# Patient Record
Sex: FEMALE | Race: WHITE | Employment: OTHER | URBAN - METROPOLITAN AREA
[De-identification: names, ages, dates, MRNs, and addresses within clinical notes are randomized per-mention and may not be internally consistent; named-entity substitution may affect disease eponyms.]

---
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
## 2020-01-23 ENCOUNTER — HOSPITAL ENCOUNTER (OUTPATIENT)
Dept: LAB | Age: 20
Discharge: HOME | End: 2020-01-23
Payer: COMMERCIAL

## 2020-01-23 DIAGNOSIS — E53.8 VITAMIN B12 DEFICIENCY: ICD-10-CM

## 2020-01-23 DIAGNOSIS — E61.1 IRON DEFICIENCY: ICD-10-CM

## 2020-01-23 LAB
ALBUMIN SERPL-MCNC: 3.9 G/DL (ref 3.5–5)
ALBUMIN/GLOB SERPL: 1 {RATIO} (ref 1.2–3.5)
ALP SERPL-CCNC: 66 U/L (ref 50–136)
ALT SERPL-CCNC: 27 U/L (ref 12–65)
ANION GAP SERPL CALC-SCNC: 5 MMOL/L (ref 7–16)
AST SERPL-CCNC: 12 U/L (ref 15–37)
BASOPHILS # BLD: 0 K/UL (ref 0–0.2)
BASOPHILS NFR BLD: 1 % (ref 0–2)
BILIRUB SERPL-MCNC: 0.2 MG/DL (ref 0.2–1.1)
BUN SERPL-MCNC: 11 MG/DL (ref 6–23)
CALCIUM SERPL-MCNC: 9.2 MG/DL (ref 8.3–10.4)
CHLORIDE SERPL-SCNC: 105 MMOL/L (ref 98–107)
CO2 SERPL-SCNC: 27 MMOL/L (ref 21–32)
CREAT SERPL-MCNC: 0.6 MG/DL (ref 0.6–1)
DIFFERENTIAL METHOD BLD: ABNORMAL
EOSINOPHIL # BLD: 0 K/UL (ref 0–0.8)
EOSINOPHIL NFR BLD: 1 % (ref 0.5–7.8)
ERYTHROCYTE [DISTWIDTH] IN BLOOD BY AUTOMATED COUNT: 11.9 % (ref 11.9–14.6)
FERRITIN SERPL-MCNC: 114 NG/ML (ref 8–388)
GLOBULIN SER CALC-MCNC: 4 G/DL (ref 2.3–3.5)
GLUCOSE SERPL-MCNC: 97 MG/DL (ref 65–100)
HCT VFR BLD AUTO: 40 % (ref 35.8–46.3)
HGB BLD-MCNC: 13.7 G/DL (ref 11.7–15.4)
HGB RETIC QN AUTO: 36 PG (ref 29–35)
IMM GRANULOCYTES # BLD AUTO: 0 K/UL (ref 0–0.5)
IMM GRANULOCYTES NFR BLD AUTO: 0 % (ref 0–5)
IMM RETICS NFR: 2.2 % (ref 3–15.9)
IRON SATN MFR SERPL: 15 %
IRON SERPL-MCNC: 53 UG/DL (ref 35–150)
LYMPHOCYTES # BLD: 1.5 K/UL (ref 0.5–4.6)
LYMPHOCYTES NFR BLD: 41 % (ref 13–44)
MCH RBC QN AUTO: 30.9 PG (ref 26.1–32.9)
MCHC RBC AUTO-ENTMCNC: 34.3 G/DL (ref 31.4–35)
MCV RBC AUTO: 90.3 FL (ref 79.6–97.8)
MONOCYTES # BLD: 0.3 K/UL (ref 0.1–1.3)
MONOCYTES NFR BLD: 7 % (ref 4–12)
NEUTS SEG # BLD: 1.8 K/UL (ref 1.7–8.2)
NEUTS SEG NFR BLD: 50 % (ref 43–78)
NRBC # BLD: 0 K/UL (ref 0–0.2)
PLATELET # BLD AUTO: 231 K/UL (ref 150–450)
PMV BLD AUTO: 10.6 FL (ref 9.4–12.3)
POTASSIUM SERPL-SCNC: 3.8 MMOL/L (ref 3.5–5.1)
PROT SERPL-MCNC: 7.9 G/DL (ref 6.3–8.2)
RBC # BLD AUTO: 4.43 M/UL (ref 4.05–5.25)
RETICS # AUTO: 0.06 M/UL (ref 0.03–0.1)
RETICS/RBC NFR AUTO: 1.4 % (ref 0.3–2)
SODIUM SERPL-SCNC: 137 MMOL/L (ref 136–145)
TIBC SERPL-MCNC: 349 UG/DL (ref 250–450)
VIT B12 SERPL-MCNC: 300 PG/ML (ref 193–986)
WBC # BLD AUTO: 3.6 K/UL (ref 4.3–11.1)

## 2020-01-23 PROCEDURE — 36415 COLL VENOUS BLD VENIPUNCTURE: CPT

## 2020-01-23 PROCEDURE — 82607 VITAMIN B-12: CPT

## 2020-01-23 PROCEDURE — 83540 ASSAY OF IRON: CPT

## 2020-01-23 PROCEDURE — 82728 ASSAY OF FERRITIN: CPT

## 2020-01-23 PROCEDURE — 80053 COMPREHEN METABOLIC PANEL: CPT

## 2020-01-23 PROCEDURE — 85025 COMPLETE CBC W/AUTO DIFF WBC: CPT

## 2020-01-23 PROCEDURE — 85046 RETICYTE/HGB CONCENTRATE: CPT

## 2020-01-30 ENCOUNTER — HOSPITAL ENCOUNTER (OUTPATIENT)
Dept: INFUSION THERAPY | Age: 20
Discharge: HOME | End: 2020-01-30
Payer: COMMERCIAL

## 2020-01-30 ENCOUNTER — HOSPITAL ENCOUNTER (OUTPATIENT)
Dept: LAB | Age: 20
Discharge: HOME | End: 2020-01-30
Payer: COMMERCIAL

## 2020-01-30 VITALS
OXYGEN SATURATION: 100 % | TEMPERATURE: 98.6 F | SYSTOLIC BLOOD PRESSURE: 136 MMHG | HEART RATE: 78 BPM | DIASTOLIC BLOOD PRESSURE: 79 MMHG | RESPIRATION RATE: 18 BRPM

## 2020-01-30 DIAGNOSIS — E53.8 VITAMIN B12 DEFICIENCY: Primary | ICD-10-CM

## 2020-01-30 DIAGNOSIS — E61.1 IRON DEFICIENCY: ICD-10-CM

## 2020-01-30 LAB — HCG SERPL QL: NEGATIVE

## 2020-01-30 PROCEDURE — 74011250636 HC RX REV CODE- 250/636: Performed by: INTERNAL MEDICINE

## 2020-01-30 PROCEDURE — 36415 COLL VENOUS BLD VENIPUNCTURE: CPT

## 2020-01-30 PROCEDURE — 84703 CHORIONIC GONADOTROPIN ASSAY: CPT

## 2020-01-30 PROCEDURE — 96372 THER/PROPH/DIAG INJ SC/IM: CPT

## 2020-01-30 PROCEDURE — 96365 THER/PROPH/DIAG IV INF INIT: CPT

## 2020-01-30 PROCEDURE — 96361 HYDRATE IV INFUSION ADD-ON: CPT

## 2020-01-30 RX ORDER — CYANOCOBALAMIN 1000 UG/ML
1000 INJECTION, SOLUTION INTRAMUSCULAR; SUBCUTANEOUS ONCE
Status: COMPLETED | OUTPATIENT
Start: 2020-01-30 | End: 2020-01-30

## 2020-01-30 RX ORDER — SODIUM CHLORIDE 0.9 % (FLUSH) 0.9 %
10 SYRINGE (ML) INJECTION AS NEEDED
Status: DISCONTINUED | OUTPATIENT
Start: 2020-01-30 | End: 2020-02-03 | Stop reason: HOSPADM

## 2020-01-30 RX ADMIN — SODIUM CHLORIDE 750 MG: 900 INJECTION, SOLUTION INTRAVENOUS at 17:16

## 2020-01-30 RX ADMIN — CYANOCOBALAMIN 1000 MCG: 1000 INJECTION, SOLUTION INTRAMUSCULAR; SUBCUTANEOUS at 17:10

## 2020-01-30 RX ADMIN — SODIUM CHLORIDE 500 ML: 900 INJECTION, SOLUTION INTRAVENOUS at 17:36

## 2020-01-30 RX ADMIN — Medication 10 ML: at 18:14

## 2020-01-30 NOTE — PROGRESS NOTES
Pt arrived via wheelchair, b12 given IM, injectafer infused, pt tolerated well, monitored for 30 min, no adverse side effects, discharged home via wheelchair

## 2020-01-31 ENCOUNTER — HOSPITAL ENCOUNTER (EMERGENCY)
Age: 20
Discharge: HOME | End: 2020-01-31
Attending: EMERGENCY MEDICINE
Payer: COMMERCIAL

## 2020-01-31 ENCOUNTER — APPOINTMENT (OUTPATIENT)
Dept: CT IMAGING | Age: 20
End: 2020-01-31
Attending: EMERGENCY MEDICINE
Payer: COMMERCIAL

## 2020-01-31 VITALS
RESPIRATION RATE: 18 BRPM | SYSTOLIC BLOOD PRESSURE: 126 MMHG | HEART RATE: 76 BPM | DIASTOLIC BLOOD PRESSURE: 73 MMHG | BODY MASS INDEX: 24.19 KG/M2 | WEIGHT: 120 LBS | TEMPERATURE: 98.8 F | OXYGEN SATURATION: 100 % | HEIGHT: 59 IN

## 2020-01-31 DIAGNOSIS — R11.2 NON-INTRACTABLE VOMITING WITH NAUSEA, UNSPECIFIED VOMITING TYPE: Primary | ICD-10-CM

## 2020-01-31 DIAGNOSIS — R42 LIGHTHEADEDNESS: ICD-10-CM

## 2020-01-31 LAB
ALBUMIN SERPL-MCNC: 4.3 G/DL (ref 3.5–5)
ALBUMIN/GLOB SERPL: 1.1 {RATIO} (ref 1.2–3.5)
ALP SERPL-CCNC: 62 U/L (ref 50–136)
ALT SERPL-CCNC: 32 U/L (ref 12–65)
ANION GAP SERPL CALC-SCNC: 6 MMOL/L (ref 7–16)
AST SERPL-CCNC: 18 U/L (ref 15–37)
BASOPHILS # BLD: 0 K/UL (ref 0–0.2)
BASOPHILS NFR BLD: 0 % (ref 0–2)
BILIRUB SERPL-MCNC: 0.3 MG/DL (ref 0.2–1.1)
BUN SERPL-MCNC: 11 MG/DL (ref 6–23)
CALCIUM SERPL-MCNC: 9.7 MG/DL (ref 8.3–10.4)
CHLORIDE SERPL-SCNC: 109 MMOL/L (ref 98–107)
CO2 SERPL-SCNC: 26 MMOL/L (ref 21–32)
CREAT SERPL-MCNC: 0.59 MG/DL (ref 0.6–1)
DIFFERENTIAL METHOD BLD: ABNORMAL
EOSINOPHIL # BLD: 0 K/UL (ref 0–0.8)
EOSINOPHIL NFR BLD: 0 % (ref 0.5–7.8)
ERYTHROCYTE [DISTWIDTH] IN BLOOD BY AUTOMATED COUNT: 11.4 % (ref 11.9–14.6)
GLOBULIN SER CALC-MCNC: 3.9 G/DL (ref 2.3–3.5)
GLUCOSE SERPL-MCNC: 132 MG/DL (ref 65–100)
HCG UR QL: NEGATIVE
HCT VFR BLD AUTO: 40.5 % (ref 35.8–46.3)
HGB BLD-MCNC: 13.8 G/DL (ref 11.7–15.4)
IMM GRANULOCYTES # BLD AUTO: 0 K/UL (ref 0–0.5)
IMM GRANULOCYTES NFR BLD AUTO: 0 % (ref 0–5)
LIPASE SERPL-CCNC: 133 U/L (ref 73–393)
LYMPHOCYTES # BLD: 1.3 K/UL (ref 0.5–4.6)
LYMPHOCYTES NFR BLD: 26 % (ref 13–44)
MCH RBC QN AUTO: 30.7 PG (ref 26.1–32.9)
MCHC RBC AUTO-ENTMCNC: 34.1 G/DL (ref 31.4–35)
MCV RBC AUTO: 90.2 FL (ref 79.6–97.8)
MONOCYTES # BLD: 0.3 K/UL (ref 0.1–1.3)
MONOCYTES NFR BLD: 5 % (ref 4–12)
NEUTS SEG # BLD: 3.4 K/UL (ref 1.7–8.2)
NEUTS SEG NFR BLD: 68 % (ref 43–78)
NRBC # BLD: 0 K/UL (ref 0–0.2)
PLATELET # BLD AUTO: 234 K/UL (ref 150–450)
PMV BLD AUTO: 11.1 FL (ref 9.4–12.3)
POTASSIUM SERPL-SCNC: 4.1 MMOL/L (ref 3.5–5.1)
PROT SERPL-MCNC: 8.2 G/DL (ref 6.3–8.2)
RBC # BLD AUTO: 4.49 M/UL (ref 4.05–5.2)
SODIUM SERPL-SCNC: 141 MMOL/L (ref 136–145)
WBC # BLD AUTO: 5 K/UL (ref 4.3–11.1)

## 2020-01-31 PROCEDURE — 74011250636 HC RX REV CODE- 250/636: Performed by: EMERGENCY MEDICINE

## 2020-01-31 PROCEDURE — 99285 EMERGENCY DEPT VISIT HI MDM: CPT

## 2020-01-31 PROCEDURE — 81025 URINE PREGNANCY TEST: CPT

## 2020-01-31 PROCEDURE — 80053 COMPREHEN METABOLIC PANEL: CPT

## 2020-01-31 PROCEDURE — 83690 ASSAY OF LIPASE: CPT

## 2020-01-31 PROCEDURE — 85025 COMPLETE CBC W/AUTO DIFF WBC: CPT

## 2020-01-31 PROCEDURE — 70450 CT HEAD/BRAIN W/O DYE: CPT

## 2020-01-31 RX ORDER — PROMETHAZINE HYDROCHLORIDE 25 MG/1
25 TABLET ORAL
Qty: 12 TAB | Refills: 0 | Status: SHIPPED | OUTPATIENT
Start: 2020-01-31

## 2020-01-31 RX ADMIN — SODIUM CHLORIDE 1000 ML: 900 INJECTION, SOLUTION INTRAVENOUS at 14:01

## 2020-01-31 NOTE — DISCHARGE INSTRUCTIONS
Patient Education        Lightheadedness or Faintness: Care Instructions  Your Care Instructions  Lightheadedness is a feeling that you are about to faint or \"pass out. \" You do not feel as if you or your surroundings are moving. It is different from vertigo, which is the feeling that you or things around you are spinning or tilting. Lightheadedness usually goes away or gets better when you lie down. If lightheadedness gets worse, it can lead to a fainting spell. It is common to feel lightheaded from time to time. Lightheadedness usually is not caused by a serious problem. It often is caused by a short-lasting drop in blood pressure and blood flow to your head that occurs when you get up too quickly from a seated or lying position. Follow-up care is a key part of your treatment and safety. Be sure to make and go to all appointments, and call your doctor if you are having problems. It's also a good idea to know your test results and keep a list of the medicines you take. How can you care for yourself at home? · Lie down for 1 or 2 minutes when you feel lightheaded. After lying down, sit up slowly and remain sitting for 1 to 2 minutes before slowly standing up. · Avoid movements, positions, or activities that have made you lightheaded in the past.  · Get plenty of rest, especially if you have a cold or flu, which can cause lightheadedness. · Make sure you drink plenty of fluids, especially if you have a fever or have been sweating. · Do not drive or put yourself and others in danger while you feel lightheaded. When should you call for help? Call 911 anytime you think you may need emergency care. For example, call if:    · You have symptoms of a stroke. These may include:  ? Sudden numbness, tingling, weakness, or loss of movement in your face, arm, or leg, especially on only one side of your body. ? Sudden vision changes. ? Sudden trouble speaking.   ? Sudden confusion or trouble understanding simple statements. ? Sudden problems with walking or balance. ? A sudden, severe headache that is different from past headaches.     · You have symptoms of a heart attack. These may include:  ? Chest pain or pressure, or a strange feeling in the chest.  ? Sweating. ? Shortness of breath. ? Nausea or vomiting. ? Pain, pressure, or a strange feeling in the back, neck, jaw, or upper belly or in one or both shoulders or arms. ? Lightheadedness or sudden weakness. ? A fast or irregular heartbeat. After you call 911, the  may tell you to chew 1 adult-strength or 2 to 4 low-dose aspirin. Wait for an ambulance. Do not try to drive yourself.    Watch closely for changes in your health, and be sure to contact your doctor if:    · Your lightheadedness gets worse or does not get better with home care. Where can you learn more? Go to http://[employer]um[employer].info/. Enter K997 in the search box to learn more about \"Lightheadedness or Faintness: Care Instructions. \"  Current as of: June 26, 2019  Content Version: 12.2  © 3166-7307 [employer]. Care instructions adapted under license by [employer] (which disclaims liability or warranty for this information). If you have questions about a medical condition or this instruction, always ask your healthcare professional. [place] [address] any warranty or liability for your use of this information. Patient Education        Nausea and Vomiting: Care Instructions  Your Care Instructions    When you are nauseated, you may feel weak and sweaty and notice a lot of saliva in your mouth. Nausea often leads to vomiting. Most of the time you do not need to worry about nausea and vomiting, but they can be signs of other illnesses. Two common causes of nausea and vomiting are stomach flu and food poisoning. Nausea and vomiting from viral stomach flu will usually start to improve within 24 hours.  Nausea and vomiting from food poisoning may last from 12 to 48 hours. The doctor has checked you carefully, but problems can develop later. If you notice any problems or new symptoms, get medical treatment right away. Follow-up care is a key part of your treatment and safety. Be sure to make and go to all appointments, and call your doctor if you are having problems. It's also a good idea to know your test results and keep a list of the medicines you take. How can you care for yourself at home? · To prevent dehydration, drink plenty of fluids, enough so that your urine is light yellow or clear like water. Choose water and other caffeine-free clear liquids until you feel better. If you have kidney, heart, or liver disease and have to limit fluids, talk with your doctor before you increase the amount of fluids you drink. · Rest in bed until you feel better. · When you are able to eat, try clear soups, mild foods, and liquids until all symptoms are gone for 12 to 48 hours. Other good choices include dry toast, crackers, cooked cereal, and gelatin dessert, such as Jell-O. When should you call for help? Call 911 anytime you think you may need emergency care. For example, call if:    · You passed out (lost consciousness).    Call your doctor now or seek immediate medical care if:    · You have symptoms of dehydration, such as:  ? Dry eyes and a dry mouth. ? Passing only a little dark urine. ? Feeling thirstier than usual.     · You have new or worsening belly pain.     · You have a new or higher fever.     · You vomit blood or what looks like coffee grounds.    Watch closely for changes in your health, and be sure to contact your doctor if:    · You have ongoing nausea and vomiting.     · Your vomiting is getting worse.     · Your vomiting lasts longer than 2 days.     · You are not getting better as expected. Where can you learn more? Go to http://deon-sumaya.info/.   Enter 38 [id number] in the search box to learn more about \"Nausea and Vomiting: Care Instructions. \"  Current as of: June 26, 2019  Content Version: 12.2  © 4592-9098 [employer]. Care instructions adapted under license by [employer] (which disclaims liability or warranty for this information). If you have questions about a medical condition or this instruction, always ask your healthcare professional. [address] any warranty or liability for your use of this information.

## 2020-01-31 NOTE — ED NOTES
I have reviewed discharge instructions with the patient and family. The patient and family verbalized understanding. Patient left ED via Discharge Method: wheelchair to Home with family. Opportunity for questions and clarification provided. Patient given 1 scripts. No e-sign. To continue your aftercare when you leave the hospital, you may receive an automated call from our care team to check in on how you are doing. This is a free service and part of our promise to provide the best care and service to meet your aftercare needs.  If you have questions, or wish to unsubscribe from this service please call [phone]. Thank you for Choosing our [facility].

## 2020-01-31 NOTE — ED PROVIDER NOTES
Patient with a history of diabetes on metformin. Has a history of brain calcifications. Presents with chronic nausea for the past 2 months not relieved with Zofran. Has had a couple episodes of lightheadedness and blurry vision with near syncope. Has been diagnosed with spastic paraplegia. Is followed by a pediatric neurosurgeon. Performs intermittent self caths when needed. Has a baclofen pump implanted. Blurry vision prior to arrival but none now. No chest pain or shortness of breath. No headache. The history is provided by the patient. No  was used. Nausea    This is a new problem. Episode onset: 2 months. The problem occurs 2 to 4 times per day. The problem has not changed since onset. The emesis has an appearance of stomach contents. There has been no fever. Pertinent negatives include no chills, no fever, no abdominal pain, no diarrhea, no headaches, no myalgias, no cough, no URI and no headaches. Her past medical history is significant for DM. Past Medical History:   Diagnosis Date    Diabetes ([facility] [address]     type 2    Seizures (HCC)     \"long time ago\"       Past Surgical History:   Procedure Laterality Date    CARDIAC SURG PROCEDURE UNLIST      HX ORTHOPAEDIC      \"reconstruction of bones in feet\"    VASCULAR SURGERY PROCEDURE UNLIST      \"legs on veins and muscles\"         No family history on file.     Social History     Socioeconomic History    Marital status: SINGLE     Spouse name: Not on file    Number of children: Not on file    Years of education: Not on file    Highest education level: Not on file   Occupational History    Not on file   Social Needs    Financial resource strain: Not on file    Food insecurity:     Worry: Not on file     Inability: Not on file    Transportation needs:     Medical: Not on file     Non-medical: Not on file   Tobacco Use    Smoking status: Never Smoker    Smokeless tobacco: Current User    Tobacco comment: vapes Substance and Sexual Activity    Alcohol use: No     Frequency: Never    Drug use: No    Sexual activity: Not on file   Lifestyle    Physical activity:     Days per week: Not on file     Minutes per session: Not on file    Stress: Not on file   Relationships    Social connections:     Talks on phone: Not on file     Gets together: Not on file     Attends [employer] service: Not on file     Active member of club or organization: Not on file     Attends meetings of clubs or organizations: Not on file     Relationship status: Not on file    Intimate partner violence:     Fear of current or ex partner: Not on file     Emotionally abused: Not on file     Physically abused: Not on file     Forced sexual activity: Not on file   Other Topics Concern    Not on file   Social History Narrative    Not on file         ALLERGIES: Latex; Hydrocodone-acetaminophen; Oxcarbazepine; Pertussis vaccines; Adhesive; Metformin; and Other medication    Review of Systems   Constitutional: Negative for chills and fever. HENT: Negative for rhinorrhea and sore throat. Eyes: Negative for pain, redness and visual disturbance. Respiratory: Negative for cough, chest tightness, shortness of breath and wheezing. Cardiovascular: Negative for chest pain and leg swelling. Gastrointestinal: Positive for nausea. Negative for abdominal pain, diarrhea and vomiting. Genitourinary: Negative for dysuria and hematuria. Musculoskeletal: Negative for back pain, gait problem, myalgias, neck pain and neck stiffness. Skin: Negative for color change and rash. Neurological: Positive for light-headedness. Negative for facial asymmetry, speech difficulty, weakness, numbness and headaches. Psychiatric/Behavioral: Negative for confusion.        Vitals:    01/31/20 1219   BP: 126/84   Pulse: 84   Resp: 18   Temp: 99.6 °F (37.6 °C)   SpO2: 100%   Weight: 54.4 kg (120 lb)   Height: 4' 11\" (1.499 m)            Physical Exam  Constitutional: Appearance: Normal appearance. She is well-developed. HENT:      Head: Normocephalic and atraumatic. Eyes:      Extraocular Movements: Extraocular movements intact. Pupils: Pupils are equal, round, and reactive to light. Neck:      Musculoskeletal: Normal range of motion and neck supple. Cardiovascular:      Rate and Rhythm: Normal rate and regular rhythm. Pulmonary:      Effort: Pulmonary effort is normal.      Breath sounds: Normal breath sounds. Abdominal:      General: Bowel sounds are normal.      Palpations: Abdomen is soft. Tenderness: There is no abdominal tenderness. Musculoskeletal: Normal range of motion. General: No swelling. Skin:     General: Skin is warm and dry. Neurological:      General: No focal deficit present. Mental Status: She is alert and oriented to person, place, and time. MDM  Number of Diagnoses or Management Options  Diagnosis management comments: Was given fluids in the ER. No vomiting here. Blood work looks good. CT shows calcifications which is known to patient. Will discharge with Phenergan and have her follow-up with GI. Possible delayed gastric emptying with history of diabetes. Amount and/or Complexity of Data Reviewed  Clinical lab tests: ordered and reviewed  Tests in the radiology section of CPT®: ordered and reviewed  Tests in the medicine section of CPT®: ordered and reviewed    Patient Progress  Patient progress: stable         Procedures         CT HEAD WO CONT (Final result)   Result time 01/31/20 14:47:38   Final result by Alex Leroy MD (01/31/20 14:47:38)                Impression:    IMPRESSION: Multiple white matter calcifications.  No CT evidence of acute  intracranial abnormality.             Narrative:    Head CT    INDICATION: Nausea, history of brain tumors and syncope    Multiple axial images obtained through the brain without intravenous contrast.   Radiation dose reduction techniques were used for this study:  All CT scans  performed at this facility use one or all of the following: Automated exposure  control, adjustment of the mA and/or kVp according to patient's size, iterative  reconstruction. FINDINGS: White matter calcifications are noted in the frontal lobes bilaterally  and in the posterior right parietal region. Martínez Ashish is also bilateral basal  ganglia calcification. There is no CT evidence of acute hemorrhage or  infarction. The ventricles are normal in size. There are no extra-axial fluid  collections. No masses are seen. The sinuses are clear. There are no bony  lesions. Results Include:    Recent Results (from the past 24 hour(s))   HCG QL SERUM    Collection Time: 01/30/20  4:28 PM   Result Value Ref Range    HCG, Ql. NEGATIVE  NEG     CBC WITH AUTOMATED DIFF    Collection Time: 01/31/20 12:23 PM   Result Value Ref Range    WBC 5.0 4.3 - 11.1 K/uL    RBC 4.49 4.05 - 5.2 M/uL    HGB 13.8 11.7 - 15.4 g/dL    HCT 40.5 35.8 - 46.3 %    MCV 90.2 79.6 - 97.8 FL    MCH 30.7 26.1 - 32.9 PG    MCHC 34.1 31.4 - 35.0 g/dL    RDW 11.4 (L) 11.9 - 14.6 %    PLATELET 496 170 - 880 K/uL    MPV 11.1 9.4 - 12.3 FL    ABSOLUTE NRBC 0.00 0.0 - 0.2 K/uL    DF AUTOMATED      NEUTROPHILS 68 43 - 78 %    LYMPHOCYTES 26 13 - 44 %    MONOCYTES 5 4.0 - 12.0 %    EOSINOPHILS 0 (L) 0.5 - 7.8 %    BASOPHILS 0 0.0 - 2.0 %    IMMATURE GRANULOCYTES 0 0.0 - 5.0 %    ABS. NEUTROPHILS 3.4 1.7 - 8.2 K/UL    ABS. LYMPHOCYTES 1.3 0.5 - 4.6 K/UL    ABS. MONOCYTES 0.3 0.1 - 1.3 K/UL    ABS. EOSINOPHILS 0.0 0.0 - 0.8 K/UL    ABS. BASOPHILS 0.0 0.0 - 0.2 K/UL    ABS. IMM.  GRANS. 0.0 0.0 - 0.5 K/UL   METABOLIC PANEL, COMPREHENSIVE    Collection Time: 01/31/20 12:23 PM   Result Value Ref Range    Sodium 141 136 - 145 mmol/L    Potassium 4.1 3.5 - 5.1 mmol/L    Chloride 109 (H) 98 - 107 mmol/L    CO2 26 21 - 32 mmol/L    Anion gap 6 (L) 7 - 16 mmol/L    Glucose 132 (H) 65 - 100 mg/dL    BUN 11 6 - 23 MG/DL    Creatinine 0.59 (L) 0.6 - 1.0 MG/DL    GFR est AA >60 >60 ml/min/1.73m2    GFR est non-AA >60 >60 ml/min/1.73m2    Calcium 9.7 8.3 - 10.4 MG/DL    Bilirubin, total 0.3 0.2 - 1.1 MG/DL    ALT (SGPT) 32 12 - 65 U/L    AST (SGOT) 18 15 - 37 U/L    Alk.  phosphatase 62 50 - 136 U/L    Protein, total 8.2 6.3 - 8.2 g/dL    Albumin 4.3 3.5 - 5.0 g/dL    Globulin 3.9 (H) 2.3 - 3.5 g/dL    A-G Ratio 1.1 (L) 1.2 - 3.5     LIPASE    Collection Time: 01/31/20 12:23 PM   Result Value Ref Range    Lipase 133 73 - 393 U/L   HCG URINE, QL. - POC    Collection Time: 01/31/20  2:39 PM   Result Value Ref Range    Pregnancy test,urine (POC) NEGATIVE  NEG

## 2020-01-31 NOTE — ED TRIAGE NOTES
EMS called for nausea and blurry vision. Hx of brain tumors and syncopal episode today. Pt is spastic paraplegic 76.  bp 112/57, hr 70s SR, 98% RA, bgl 142, 99.2. Blurry vision started 1.5 hours ago and cleared up. Pt has implanted baclofen pump.

## 2020-02-07 ENCOUNTER — HOSPITAL ENCOUNTER (OUTPATIENT)
Dept: INFUSION THERAPY | Age: 20
Discharge: HOME | End: 2020-02-07
Payer: COMMERCIAL

## 2020-02-07 VITALS
DIASTOLIC BLOOD PRESSURE: 67 MMHG | TEMPERATURE: 98.3 F | RESPIRATION RATE: 18 BRPM | OXYGEN SATURATION: 94 % | HEART RATE: 86 BPM | SYSTOLIC BLOOD PRESSURE: 118 MMHG

## 2020-02-07 DIAGNOSIS — E53.8 VITAMIN B12 DEFICIENCY: Primary | ICD-10-CM

## 2020-02-07 DIAGNOSIS — E61.1 IRON DEFICIENCY: ICD-10-CM

## 2020-02-07 PROCEDURE — 74011250636 HC RX REV CODE- 250/636: Performed by: INTERNAL MEDICINE

## 2020-02-07 PROCEDURE — 96372 THER/PROPH/DIAG INJ SC/IM: CPT

## 2020-02-07 RX ORDER — CYANOCOBALAMIN 1000 UG/ML
1000 INJECTION, SOLUTION INTRAMUSCULAR; SUBCUTANEOUS ONCE
Status: COMPLETED | OUTPATIENT
Start: 2020-02-07 | End: 2020-02-07

## 2020-02-07 RX ADMIN — CYANOCOBALAMIN 1000 MCG: 1000 INJECTION, SOLUTION INTRAMUSCULAR; SUBCUTANEOUS at 17:10

## 2020-02-07 NOTE — PROGRESS NOTES
Pt arrived via wheelchair today at 21 , to receive Vitamin B-12. Pt tolerated without difficulty. Patient discharged via wheelchair accompanied by spouse. Instructed to notify physician of any problems, questions or concerns. Allowed opportunity for patient/family to ask questions. Verbalized understanding. Next appointment is Feb 13 at 1700   with Justin Burgos.

## 2020-02-10 ENCOUNTER — HOSPITAL ENCOUNTER (OUTPATIENT)
Dept: LAB | Age: 20
Discharge: HOME | End: 2020-02-10

## 2020-02-10 PROCEDURE — 88305 TISSUE EXAM BY PATHOLOGIST: CPT

## 2020-02-10 PROCEDURE — 88312 SPECIAL STAINS GROUP 1: CPT

## 2020-08-31 ENCOUNTER — HOSPITAL ENCOUNTER (OUTPATIENT)
Dept: LAB | Age: 20
Discharge: HOME | End: 2020-08-31
Payer: COMMERCIAL

## 2020-08-31 ENCOUNTER — HOSPITAL ENCOUNTER (OUTPATIENT)
Dept: INFUSION THERAPY | Age: 20
Discharge: HOME | End: 2020-08-31
Payer: COMMERCIAL

## 2020-08-31 DIAGNOSIS — E61.1 IRON DEFICIENCY: ICD-10-CM

## 2020-08-31 DIAGNOSIS — E53.8 VITAMIN B12 DEFICIENCY: ICD-10-CM

## 2020-08-31 DIAGNOSIS — E53.8 VITAMIN B12 DEFICIENCY: Primary | ICD-10-CM

## 2020-08-31 LAB
ALBUMIN SERPL-MCNC: 4 G/DL (ref 3.5–5)
ALBUMIN/GLOB SERPL: 1.1 {RATIO} (ref 1.2–3.5)
ALP SERPL-CCNC: 81 U/L (ref 50–136)
ALT SERPL-CCNC: 26 U/L (ref 12–65)
ANION GAP SERPL CALC-SCNC: 3 MMOL/L (ref 7–16)
AST SERPL-CCNC: 15 U/L (ref 15–37)
BASOPHILS # BLD: 0 K/UL (ref 0–0.2)
BASOPHILS NFR BLD: 1 % (ref 0–2)
BILIRUB SERPL-MCNC: 0.6 MG/DL (ref 0.2–1.1)
BUN SERPL-MCNC: 7 MG/DL (ref 6–23)
CALCIUM SERPL-MCNC: 9.4 MG/DL (ref 8.3–10.4)
CHLORIDE SERPL-SCNC: 104 MMOL/L (ref 98–107)
CO2 SERPL-SCNC: 30 MMOL/L (ref 21–32)
CREAT SERPL-MCNC: 0.6 MG/DL (ref 0.6–1)
DIFFERENTIAL METHOD BLD: ABNORMAL
EOSINOPHIL # BLD: 0.1 K/UL (ref 0–0.8)
EOSINOPHIL NFR BLD: 1 % (ref 0.5–7.8)
ERYTHROCYTE [DISTWIDTH] IN BLOOD BY AUTOMATED COUNT: 11.7 % (ref 11.9–14.6)
FERRITIN SERPL-MCNC: 353 NG/ML (ref 8–388)
GLOBULIN SER CALC-MCNC: 3.8 G/DL (ref 2.3–3.5)
GLUCOSE SERPL-MCNC: 136 MG/DL (ref 65–100)
HCT VFR BLD AUTO: 39.1 % (ref 35.8–46.3)
HGB BLD-MCNC: 14 G/DL (ref 11.7–15.4)
IMM GRANULOCYTES # BLD AUTO: 0 K/UL (ref 0–0.5)
IMM GRANULOCYTES NFR BLD AUTO: 0 % (ref 0–5)
IRON SATN MFR SERPL: 33 %
IRON SERPL-MCNC: 113 UG/DL (ref 35–150)
LYMPHOCYTES # BLD: 1.2 K/UL (ref 0.5–4.6)
LYMPHOCYTES NFR BLD: 33 % (ref 13–44)
MCH RBC QN AUTO: 31.7 PG (ref 26.1–32.9)
MCHC RBC AUTO-ENTMCNC: 35.8 G/DL (ref 31.4–35)
MCV RBC AUTO: 88.7 FL (ref 79.6–97.8)
MONOCYTES # BLD: 0.3 K/UL (ref 0.1–1.3)
MONOCYTES NFR BLD: 10 % (ref 4–12)
NEUTS SEG # BLD: 2 K/UL (ref 1.7–8.2)
NEUTS SEG NFR BLD: 55 % (ref 43–78)
NRBC # BLD: 0 K/UL (ref 0–0.2)
PLATELET # BLD AUTO: 212 K/UL (ref 150–450)
PMV BLD AUTO: 10.4 FL (ref 9.4–12.3)
POTASSIUM SERPL-SCNC: 4 MMOL/L (ref 3.5–5.1)
PROT SERPL-MCNC: 7.8 G/DL (ref 6.3–8.2)
RBC # BLD AUTO: 4.41 M/UL (ref 4.05–5.25)
SODIUM SERPL-SCNC: 137 MMOL/L (ref 136–145)
TIBC SERPL-MCNC: 342 UG/DL (ref 250–450)
VIT B12 SERPL-MCNC: 216 PG/ML (ref 193–986)
WBC # BLD AUTO: 3.6 K/UL (ref 4.3–11.1)

## 2020-08-31 PROCEDURE — 96372 THER/PROPH/DIAG INJ SC/IM: CPT

## 2020-08-31 PROCEDURE — 82728 ASSAY OF FERRITIN: CPT

## 2020-08-31 PROCEDURE — 74011250636 HC RX REV CODE- 250/636: Performed by: INTERNAL MEDICINE

## 2020-08-31 PROCEDURE — 36415 COLL VENOUS BLD VENIPUNCTURE: CPT

## 2020-08-31 PROCEDURE — 83540 ASSAY OF IRON: CPT

## 2020-08-31 PROCEDURE — 85025 COMPLETE CBC W/AUTO DIFF WBC: CPT

## 2020-08-31 PROCEDURE — 80053 COMPREHEN METABOLIC PANEL: CPT

## 2020-08-31 PROCEDURE — 82607 VITAMIN B-12: CPT

## 2020-08-31 RX ORDER — CYANOCOBALAMIN 1000 UG/ML
1000 INJECTION, SOLUTION INTRAMUSCULAR; SUBCUTANEOUS ONCE
Status: COMPLETED | OUTPATIENT
Start: 2020-08-31 | End: 2020-08-31

## 2020-08-31 RX ADMIN — CYANOCOBALAMIN 1000 MCG: 1000 INJECTION, SOLUTION INTRAMUSCULAR at 14:54

## 2020-08-31 NOTE — PROGRESS NOTES
Arrived to the [facility]. B12 IM completed. Patient tolerated without adverse reaction. Any issues or concerns during appointment: none. Discharged to home.

## 2020-09-08 ENCOUNTER — APPOINTMENT (OUTPATIENT)
Dept: INFUSION THERAPY | Age: 20
End: 2020-09-08

## 2020-10-12 ENCOUNTER — HOSPITAL ENCOUNTER (OUTPATIENT)
Dept: LAB | Age: 20
Discharge: HOME | End: 2020-10-12
Payer: COMMERCIAL

## 2020-10-12 DIAGNOSIS — E61.1 IRON DEFICIENCY: ICD-10-CM

## 2020-10-12 DIAGNOSIS — E53.8 VITAMIN B12 DEFICIENCY: ICD-10-CM

## 2020-10-12 LAB
ALBUMIN SERPL-MCNC: 4.2 G/DL (ref 3.5–5)
ALBUMIN/GLOB SERPL: 1.1 {RATIO} (ref 1.2–3.5)
ALP SERPL-CCNC: 82 U/L (ref 50–136)
ALT SERPL-CCNC: 26 U/L (ref 12–65)
ANION GAP SERPL CALC-SCNC: 7 MMOL/L (ref 7–16)
AST SERPL-CCNC: 13 U/L (ref 15–37)
BASOPHILS # BLD: 0 K/UL (ref 0–0.2)
BASOPHILS NFR BLD: 1 % (ref 0–2)
BILIRUB SERPL-MCNC: 0.5 MG/DL (ref 0.2–1.1)
BUN SERPL-MCNC: 11 MG/DL (ref 6–23)
CALCIUM SERPL-MCNC: 9.7 MG/DL (ref 8.3–10.4)
CHLORIDE SERPL-SCNC: 104 MMOL/L (ref 98–107)
CO2 SERPL-SCNC: 29 MMOL/L (ref 21–32)
CREAT SERPL-MCNC: 0.7 MG/DL (ref 0.6–1)
DIFFERENTIAL METHOD BLD: ABNORMAL
EOSINOPHIL # BLD: 0.1 K/UL (ref 0–0.8)
EOSINOPHIL NFR BLD: 1 % (ref 0.5–7.8)
ERYTHROCYTE [DISTWIDTH] IN BLOOD BY AUTOMATED COUNT: 11.8 % (ref 11.9–14.6)
FERRITIN SERPL-MCNC: 286 NG/ML (ref 8–388)
GLOBULIN SER CALC-MCNC: 3.8 G/DL (ref 2.3–3.5)
GLUCOSE SERPL-MCNC: 131 MG/DL (ref 65–100)
HCT VFR BLD AUTO: 38.9 % (ref 35.8–46.3)
HGB BLD-MCNC: 13.4 G/DL (ref 11.7–15.4)
IMM GRANULOCYTES # BLD AUTO: 0 K/UL (ref 0–0.5)
IMM GRANULOCYTES NFR BLD AUTO: 1 % (ref 0–5)
IRON SATN MFR SERPL: 29 %
IRON SERPL-MCNC: 99 UG/DL (ref 35–150)
LYMPHOCYTES # BLD: 1.6 K/UL (ref 0.5–4.6)
LYMPHOCYTES NFR BLD: 45 % (ref 13–44)
MCH RBC QN AUTO: 31.2 PG (ref 26.1–32.9)
MCHC RBC AUTO-ENTMCNC: 34.4 G/DL (ref 31.4–35)
MCV RBC AUTO: 90.7 FL (ref 79.6–97.8)
MONOCYTES # BLD: 0.3 K/UL (ref 0.1–1.3)
MONOCYTES NFR BLD: 10 % (ref 4–12)
NEUTS SEG # BLD: 1.5 K/UL (ref 1.7–8.2)
NEUTS SEG NFR BLD: 43 % (ref 43–78)
NRBC # BLD: 0 K/UL (ref 0–0.2)
PLATELET # BLD AUTO: 218 K/UL (ref 150–450)
PMV BLD AUTO: 10.5 FL (ref 9.4–12.3)
POTASSIUM SERPL-SCNC: 4.2 MMOL/L (ref 3.5–5.1)
PROT SERPL-MCNC: 8 G/DL (ref 6.3–8.2)
RBC # BLD AUTO: 4.29 M/UL (ref 4.05–5.25)
SODIUM SERPL-SCNC: 140 MMOL/L (ref 136–145)
TIBC SERPL-MCNC: 337 UG/DL (ref 250–450)
VIT B12 SERPL-MCNC: 887 PG/ML (ref 193–986)
WBC # BLD AUTO: 3.5 K/UL (ref 4.3–11.1)

## 2020-10-12 PROCEDURE — 82728 ASSAY OF FERRITIN: CPT

## 2020-10-12 PROCEDURE — 80053 COMPREHEN METABOLIC PANEL: CPT

## 2020-10-12 PROCEDURE — 83540 ASSAY OF IRON: CPT

## 2020-10-12 PROCEDURE — 85025 COMPLETE CBC W/AUTO DIFF WBC: CPT

## 2020-10-12 PROCEDURE — 82607 VITAMIN B-12: CPT

## 2020-10-12 PROCEDURE — 36415 COLL VENOUS BLD VENIPUNCTURE: CPT

## 2020-12-01 PROBLEM — N31.9 NEUROGENIC BLADDER: Status: ACTIVE | Noted: 2020-12-01

## 2020-12-01 PROBLEM — Z86.69 H/O PARAPLEGIA: Status: ACTIVE | Noted: 2020-12-01

## 2020-12-01 PROBLEM — N39.0 RECURRENT UTI: Status: ACTIVE | Noted: 2020-12-01

## 2020-12-01 PROBLEM — Z78.9 HISTORY OF URINARY SELF-CATHETERIZATION: Status: ACTIVE | Noted: 2020-12-01

## 2021-10-04 PROCEDURE — 88305 TISSUE EXAM BY PATHOLOGIST: CPT

## 2021-10-05 ENCOUNTER — HOSPITAL ENCOUNTER (OUTPATIENT)
Dept: LAB | Age: 21
Discharge: HOME | End: 2021-10-05

## 2022-03-18 PROBLEM — N31.9 NEUROGENIC BLADDER: Status: ACTIVE | Noted: 2020-12-01

## 2022-03-18 PROBLEM — K59.09 OTHER CONSTIPATION: Status: ACTIVE | Noted: 2018-09-19

## 2022-03-19 PROBLEM — R93.89 ABNORMAL MRI: Status: ACTIVE | Noted: 2018-07-11

## 2022-03-19 PROBLEM — R41.3 MEMORY IMPAIRMENT: Status: ACTIVE | Noted: 2018-07-11

## 2022-03-19 PROBLEM — Z78.9 HISTORY OF URINARY SELF-CATHETERIZATION: Status: ACTIVE | Noted: 2020-12-01

## 2022-03-19 PROBLEM — Z86.69 H/O PARAPLEGIA: Status: ACTIVE | Noted: 2020-12-01

## 2022-03-19 PROBLEM — N30.20 CYSTITIS BACILLARY, CHRONIC: Status: ACTIVE | Noted: 2018-11-21

## 2022-03-19 PROBLEM — Z97.8 PRESENCE OF INTRATHECAL BACLOFEN PUMP: Status: ACTIVE | Noted: 2019-01-27

## 2022-03-19 PROBLEM — E61.1 IRON DEFICIENCY: Status: ACTIVE | Noted: 2019-08-28

## 2022-03-19 PROBLEM — R33.9 RETENTION OF URINE: Status: ACTIVE | Noted: 2018-07-10

## 2022-03-19 PROBLEM — E53.8 VITAMIN B12 DEFICIENCY: Status: ACTIVE | Noted: 2019-09-03

## 2022-03-20 PROBLEM — R63.4 LOSS OF WEIGHT: Status: ACTIVE | Noted: 2018-03-19

## 2022-03-20 PROBLEM — N39.0 RECURRENT UTI: Status: ACTIVE | Noted: 2020-12-01

## 2022-06-13 ENCOUNTER — TELEPHONE (OUTPATIENT)
Dept: UROLOGY | Age: 22
End: 2022-06-13

## 2022-06-13 NOTE — TELEPHONE ENCOUNTER
Pt is requesting a refill on her nitrofurantoin (MACRODANTIN) 50 MG capsule to be Sent to [facility]. She has an apt next week but it out of the medication. Please call her with any questions she did not have this med in front of her and was not sure if this was the correct one.

## 2022-06-14 ENCOUNTER — HOSPITAL ENCOUNTER (OUTPATIENT)
Dept: CT IMAGING | Age: 22
Discharge: HOME | End: 2022-06-17
Payer: COMMERCIAL

## 2022-06-14 ENCOUNTER — OFFICE VISIT (OUTPATIENT)
Dept: UROLOGY | Age: 22
End: 2022-06-14
Payer: COMMERCIAL

## 2022-06-14 ENCOUNTER — HOSPITAL ENCOUNTER (EMERGENCY)
Age: 22
Discharge: HOME | End: 2022-06-14
Attending: EMERGENCY MEDICINE
Payer: COMMERCIAL

## 2022-06-14 VITALS
WEIGHT: 120 LBS | HEART RATE: 86 BPM | HEIGHT: 59 IN | OXYGEN SATURATION: 99 % | SYSTOLIC BLOOD PRESSURE: 113 MMHG | BODY MASS INDEX: 24.19 KG/M2 | TEMPERATURE: 99.2 F | RESPIRATION RATE: 16 BRPM | DIASTOLIC BLOOD PRESSURE: 82 MMHG

## 2022-06-14 DIAGNOSIS — N32.81 OAB (OVERACTIVE BLADDER): ICD-10-CM

## 2022-06-14 DIAGNOSIS — N23 RENAL COLIC ON LEFT SIDE: ICD-10-CM

## 2022-06-14 DIAGNOSIS — N39.0 RECURRENT UTI: ICD-10-CM

## 2022-06-14 DIAGNOSIS — R10.9 LEFT FLANK PAIN: Primary | ICD-10-CM

## 2022-06-14 DIAGNOSIS — N83.202 CYST OF LEFT OVARY: Primary | ICD-10-CM

## 2022-06-14 DIAGNOSIS — R10.9 LEFT FLANK PAIN: ICD-10-CM

## 2022-06-14 DIAGNOSIS — N20.0 RENAL STONE: ICD-10-CM

## 2022-06-14 DIAGNOSIS — N31.9 NEUROGENIC BLADDER: ICD-10-CM

## 2022-06-14 LAB
ALBUMIN SERPL-MCNC: 4.1 G/DL (ref 3.5–5)
ALBUMIN/GLOB SERPL: 1.2 {RATIO} (ref 1.2–3.5)
ALP SERPL-CCNC: 65 U/L (ref 50–136)
ALT SERPL-CCNC: 21 U/L (ref 12–65)
ANION GAP SERPL CALC-SCNC: 8 MMOL/L (ref 7–16)
AST SERPL-CCNC: 10 U/L (ref 15–37)
BASOPHILS # BLD: 0 K/UL (ref 0–0.2)
BASOPHILS NFR BLD: 1 % (ref 0–2)
BILIRUB SERPL-MCNC: 0.3 MG/DL (ref 0.2–1.1)
BILIRUB UR QL: NEGATIVE
BILIRUBIN, URINE, POC: NEGATIVE
BLOOD URINE, POC: NEGATIVE
BUN SERPL-MCNC: 10 MG/DL (ref 6–23)
CALCIUM SERPL-MCNC: 9.6 MG/DL (ref 8.3–10.4)
CHLORIDE SERPL-SCNC: 106 MMOL/L (ref 98–107)
CO2 SERPL-SCNC: 29 MMOL/L (ref 21–32)
CREAT SERPL-MCNC: 0.7 MG/DL (ref 0.6–1)
DIFFERENTIAL METHOD BLD: NORMAL
EOSINOPHIL # BLD: 0 K/UL (ref 0–0.8)
EOSINOPHIL NFR BLD: 1 % (ref 0.5–7.8)
ERYTHROCYTE [DISTWIDTH] IN BLOOD BY AUTOMATED COUNT: 12 % (ref 11.9–14.6)
GLOBULIN SER CALC-MCNC: 3.3 G/DL (ref 2.3–3.5)
GLUCOSE SERPL-MCNC: 132 MG/DL (ref 65–100)
GLUCOSE UR QL STRIP.AUTO: NEGATIVE MG/DL
GLUCOSE URINE, POC: NEGATIVE
HCG, URINE, POC: NEGATIVE
HCT VFR BLD AUTO: 37.8 % (ref 35.8–46.3)
HGB BLD-MCNC: 13.2 G/DL (ref 11.7–15.4)
IMM GRANULOCYTES # BLD AUTO: 0 K/UL (ref 0–0.5)
IMM GRANULOCYTES NFR BLD AUTO: 0 % (ref 0–5)
KETONES UR-MCNC: NEGATIVE MG/DL
KETONES, URINE, POC: NEGATIVE
LEUKOCYTE ESTERASE UR QL STRIP: NEGATIVE
LEUKOCYTE ESTERASE, URINE, POC: NEGATIVE
LIPASE SERPL-CCNC: 221 U/L (ref 73–393)
LYMPHOCYTES # BLD: 1.9 K/UL (ref 0.5–4.6)
LYMPHOCYTES NFR BLD: 38 % (ref 13–44)
Lab: NORMAL
MCH RBC QN AUTO: 31.4 PG (ref 26.1–32.9)
MCHC RBC AUTO-ENTMCNC: 34.9 G/DL (ref 31.4–35)
MCV RBC AUTO: 89.8 FL (ref 79.6–97.8)
MONOCYTES # BLD: 0.5 K/UL (ref 0.1–1.3)
MONOCYTES NFR BLD: 9 % (ref 4–12)
NEGATIVE QC PASS/FAIL: NORMAL
NEUTS SEG # BLD: 2.6 K/UL (ref 1.7–8.2)
NEUTS SEG NFR BLD: 51 % (ref 43–78)
NITRITE UR QL: NEGATIVE
NITRITE, URINE, POC: NEGATIVE
NRBC # BLD: 0 K/UL (ref 0–0.2)
PH UR: 6.5 [PH] (ref 5–9)
PH, URINE, POC: 7 (ref 4.6–8)
PLATELET # BLD AUTO: 244 K/UL (ref 150–450)
PMV BLD AUTO: 10.3 FL (ref 9.4–12.3)
POSITIVE QC PASS/FAIL: NORMAL
POTASSIUM SERPL-SCNC: 3.4 MMOL/L (ref 3.5–5.1)
PROT SERPL-MCNC: 7.4 G/DL (ref 6.3–8.2)
PROT UR QL: NEGATIVE MG/DL
PROTEIN,URINE, POC: NORMAL
RBC # BLD AUTO: 4.21 M/UL (ref 4.05–5.2)
RBC # UR STRIP: NEGATIVE /UL
SERVICE CMNT-IMP: NORMAL
SODIUM SERPL-SCNC: 143 MMOL/L (ref 136–145)
SP GR UR: 1.02 (ref 1–1.02)
SPECIFIC GRAVITY, URINE, POC: 1.02 (ref 1–1.03)
URINALYSIS CLARITY, POC: NORMAL
URINALYSIS COLOR, POC: NORMAL
UROBILINOGEN UR QL: 0.2 EU/DL (ref 0.2–1)
UROBILINOGEN, POC: NORMAL
WBC # BLD AUTO: 5.1 K/UL (ref 4.3–11.1)

## 2022-06-14 PROCEDURE — 74176 CT ABD & PELVIS W/O CONTRAST: CPT

## 2022-06-14 PROCEDURE — 81003 URINALYSIS AUTO W/O SCOPE: CPT

## 2022-06-14 PROCEDURE — 80053 COMPREHEN METABOLIC PANEL: CPT

## 2022-06-14 PROCEDURE — 99284 EMERGENCY DEPT VISIT MOD MDM: CPT

## 2022-06-14 PROCEDURE — 81003 URINALYSIS AUTO W/O SCOPE: CPT | Performed by: NURSE PRACTITIONER

## 2022-06-14 PROCEDURE — 96374 THER/PROPH/DIAG INJ IV PUSH: CPT

## 2022-06-14 PROCEDURE — 99214 OFFICE O/P EST MOD 30 MIN: CPT | Performed by: NURSE PRACTITIONER

## 2022-06-14 PROCEDURE — 6360000002 HC RX W HCPCS: Performed by: PHYSICIAN ASSISTANT

## 2022-06-14 PROCEDURE — 96372 THER/PROPH/DIAG INJ SC/IM: CPT | Performed by: NURSE PRACTITIONER

## 2022-06-14 PROCEDURE — 83690 ASSAY OF LIPASE: CPT

## 2022-06-14 PROCEDURE — 85025 COMPLETE CBC W/AUTO DIFF WBC: CPT

## 2022-06-14 RX ORDER — KETOROLAC TROMETHAMINE 30 MG/ML
30 INJECTION, SOLUTION INTRAMUSCULAR; INTRAVENOUS ONCE
Status: COMPLETED | OUTPATIENT
Start: 2022-06-14 | End: 2022-06-14

## 2022-06-14 RX ORDER — NAPROXEN 500 MG/1
500 TABLET ORAL 2 TIMES DAILY
Qty: 10 TABLET | Refills: 0 | Status: SHIPPED | OUTPATIENT
Start: 2022-06-14 | End: 2022-06-19

## 2022-06-14 RX ORDER — NITROFURANTOIN MACROCRYSTALS 50 MG/1
50 CAPSULE ORAL DAILY
Qty: 90 CAPSULE | Refills: 3 | Status: SHIPPED | OUTPATIENT
Start: 2022-06-14

## 2022-06-14 RX ORDER — SODIUM CHLORIDE 0.9 % (FLUSH) 0.9 %
3 SYRINGE (ML) INJECTION EVERY 8 HOURS
Status: DISCONTINUED | OUTPATIENT
Start: 2022-06-14 | End: 2022-06-14 | Stop reason: HOSPADM

## 2022-06-14 RX ORDER — SOLIFENACIN SUCCINATE 5 MG/1
5 TABLET, FILM COATED ORAL DAILY
Qty: 90 TABLET | Refills: 3 | Status: SHIPPED | OUTPATIENT
Start: 2022-06-14

## 2022-06-14 RX ORDER — MORPHINE SULFATE 4 MG/ML
4 INJECTION INTRAVENOUS ONCE
Status: COMPLETED | OUTPATIENT
Start: 2022-06-14 | End: 2022-06-14

## 2022-06-14 RX ADMIN — KETOROLAC TROMETHAMINE 30 MG: 30 INJECTION, SOLUTION INTRAMUSCULAR; INTRAVENOUS at 14:02

## 2022-06-14 RX ADMIN — MORPHINE SULFATE 4 MG: 4 INJECTION INTRAVENOUS at 19:40

## 2022-06-14 ASSESSMENT — ENCOUNTER SYMPTOMS
ABDOMINAL PAIN: 0
SHORTNESS OF BREATH: 0
NAUSEA: 0
ABDOMINAL PAIN: 0
BACK PAIN: 0
VOMITING: 0

## 2022-06-14 ASSESSMENT — PAIN DESCRIPTION - LOCATION
LOCATION: ABDOMEN

## 2022-06-14 ASSESSMENT — PAIN DESCRIPTION - DESCRIPTORS
DESCRIPTORS: ACHING
DESCRIPTORS: SHARP
DESCRIPTORS: ACHING
DESCRIPTORS: ACHING

## 2022-06-14 ASSESSMENT — PAIN - FUNCTIONAL ASSESSMENT
PAIN_FUNCTIONAL_ASSESSMENT: 0-10
PAIN_FUNCTIONAL_ASSESSMENT: NONE - DENIES PAIN

## 2022-06-14 ASSESSMENT — PAIN DESCRIPTION - FREQUENCY: FREQUENCY: CONTINUOUS

## 2022-06-14 ASSESSMENT — LIFESTYLE VARIABLES
HOW MANY STANDARD DRINKS CONTAINING ALCOHOL DO YOU HAVE ON A TYPICAL DAY: 3 OR 4
HOW OFTEN DO YOU HAVE A DRINK CONTAINING ALCOHOL: MONTHLY OR LESS

## 2022-06-14 ASSESSMENT — PAIN DESCRIPTION - ORIENTATION
ORIENTATION: LEFT

## 2022-06-14 ASSESSMENT — PAIN SCALES - GENERAL
PAINLEVEL_OUTOF10: 10
PAINLEVEL_OUTOF10: 3
PAINLEVEL_OUTOF10: 7

## 2022-06-14 ASSESSMENT — PAIN DESCRIPTION - PAIN TYPE: TYPE: ACUTE PAIN

## 2022-06-14 NOTE — ED TRIAGE NOTES
Pt arrives via [facility] to triage. Tearful. Pt states she had a CT scan done today that showed L ovarian cyst. Was told to come to the ER due to the pain. Masked.

## 2022-06-14 NOTE — PROGRESS NOTES
[facility]  [address] [facility]  [address]  [place], [address] [facility]  [phone]          Delcia Goldmann  : 2000    Chief Complaint   Patient presents with    Other     severe left flank pain          HPI     Delcia Goldmann is a 24 y.o. female w h/o hereditary spastic paraplegia. Previously followed with pediatric urology in the past with Dr. Anneliese Pickard under the care of Dr Andrea Rodriguez and Harman Euceda NP.      Previously she could empty her bladder w crede maneuvers. However, about 3 years ago she had to start CIC. She was only doing this about 4 times per day. At our last visit one month ago, she reported over the last year she has had to increase her CIC to up to 12 times per day. She reports intense urgency that even wakes her from her sleep. She is running out of catheters due to increased amount. She denies spontaneous voids, incontinence, or difficulty w cath placement. No gross hematuria. Most recent creatinine 10/20 was 0.70. I recommended adding anticholinergic to see if this could reduce the freq of CIC and also help w other LUTS. Today she cont to report sign relief w this. No nocturia. She is back to emptying her bladder via crede maneuvers. Rarely has to CIC.     She has h/o recurrent UTI. She has had NO recurrence of this since Dr Andrea Rodriguez started her on macrodantin 50 mg daily. Needs refill today. She was urgently worked into clinic today for sign L flank pain today. This started about 10 days ago. Today this pain became unbearable and woke her from her sleep. She has not tried any pain reliever. Assoc w n/v. She is having regular BM. Was able to have \"normal\" BM this morning. No fever/chills, gross hematuria, or new LUTS. No h/o renal stones.        Past Medical History:   Diagnosis Date    Diabetes ([address]     type 2    Seizures (HCC)     \"long time ago\"     Past Surgical History:   Procedure Laterality Date    ORTHOPEDIC SURGERY      \"reconstruction of bones in feet\"    [place] CARDIAC SURG PROCEDURE UNLIST      VASCULAR SURGERY      \"legs on veins and muscles\"     Current Outpatient Medications   Medication Sig Dispense Refill    baclofen (LIORESAL) 10 MG tablet Strength: 10 mg; Form: pump; SIG: take orally as directedOnly takes if pump malfunctions and if pump is working it is 75 mg daily via the pump      Cholecalciferol 50 MCG (2000 UT) TABS Take 1 tablet by mouth daily      cyanocobalamin 1000 MCG/ML injection Inject 1,000 mcg into the muscle every 14 days      dexlansoprazole (DEXILANT) 60 MG CPDR delayed release capsule Take 60 mg by mouth as needed      dicyclomine (BENTYL) 20 MG tablet Take 20 mg by mouth      Drospiren-Eth Estrad-Levomefol 3-0.02-0.451 MG TABS Take 1 tablet by mouth daily      DULoxetine (CYMBALTA) 60 MG extended release capsule Take 60 mg by mouth daily      ferrous sulfate 220 (44 Fe) MG/5ML solution Take 220 mg by mouth daily      fludrocortisone (FLORINEF) 0.1 MG tablet Take 0.1 mg by mouth every other day      leucovorin calcium (WELLCOVORIN) 10 MG tablet Take by mouth 3 times daily      levonorgestrel (MIRENA) IUD 52 mg 1 each by IntraUTERine route      linaclotide (LINZESS) 290 MCG CAPS capsule Take by mouth every morning (before breakfast)      melatonin 3 MG TABS tablet Take 3 mg by mouth daily as needed      metFORMIN (GLUCOPHAGE-XR) 750 MG extended release tablet Take 750 mg by mouth 2 times daily      nitrofurantoin (MACRODANTIN) 50 MG capsule Take 50 mg by mouth daily      ondansetron (ZOFRAN) 4 MG tablet Take 4 mg by mouth      promethazine (PHENERGAN) 25 MG tablet Take 25 mg by mouth every 6 hours as needed      solifenacin (VESICARE) 5 MG tablet Take 5 mg by mouth daily       Current Facility-Administered Medications   Medication Dose Route Frequency Provider Last Rate Last Admin    ketorolac (TORADOL) injection 30 mg  30 mg IntraMUSCular Once Taylor Gutting, APRN - CNP         Allergies   Allergen Reactions    Latex Anxiety  Hydrocodone-Acetaminophen Shortness Of Breath    Oxcarbazepine Shortness Of Breath    Pertussis Vaccines Shortness Of Breath     Per notes from old system she is not allergic to pertussis, Dr Deanne Dudley did not want her to have at age 3 because of the worsening neurologic problem at that time. He has since stated it is okay to give but mom declines it    Metformin Other (See Comments)     Patient denies allergy. Social History     Socioeconomic History    Marital status:      Spouse name: Not on file    Number of children: Not on file    Years of education: Not on file    Highest education level: Not on file   Occupational History    Not on file   Tobacco Use    Smoking status: Never Smoker    Smokeless tobacco: Never Used    Tobacco comment: Quit smoking: vapes   Substance and Sexual Activity    Alcohol use: No    Drug use: No    Sexual activity: Not on file   Other Topics Concern    Not on file   Social History Narrative    Not on file     Social Determinants of Health     Financial Resource Strain:     Difficulty of Paying Living Expenses: Not on file   Food Insecurity:     Worried About Running Out of Food in the Last Year: Not on file    Rajinder of Food in the Last Year: Not on file   Transportation Needs:     Lack of Transportation (Medical): Not on file    Lack of Transportation (Non-Medical):  Not on file   Physical Activity:     Days of Exercise per Week: Not on file    Minutes of Exercise per Session: Not on file   Stress:     Feeling of Stress : Not on file   Social Connections:     Frequency of Communication with Friends and Family: Not on file    Frequency of Social Gatherings with Friends and Family: Not on file    Attends Sikh Services: Not on file    Active Member of Clubs or Organizations: Not on file    Attends Club or Organization Meetings: Not on file    Marital Status: Not on file   Intimate Partner Violence:     Fear of Current or Ex-Partner: Not on file    Emotionally Abused: Not on file    Physically Abused: Not on file    Sexually Abused: Not on file   Housing Stability:     Unable to Pay for Housing in the Last Year: Not on file    Number of Places Lived in the Last Year: Not on file    Unstable Housing in the Last Year: Not on file     No family history on file. Review of Systems  Constitutional:   Negative for fever. GI:  Negative for abdominal pain. Genitourinary: Positive for flank pain. Musculoskeletal:  Negative for back pain. Urinalysis  UA - Dipstick  Results for orders placed or performed in visit on 06/14/22   AMB POC URINALYSIS DIP STICK AUTO W/O MICRO   Result Value Ref Range    Color (UA POC)      Clarity (UA POC)      Glucose, Urine, POC Negative Negative    Bilirubin, Urine, POC Negative Negative    KETONES, Urine, POC Negative Negative    Specific Gravity, Urine, POC 1.025 1.001 - 1.035    Blood (UA POC) Negative Negative    pH, Urine, POC 7.0 4.6 - 8.0    Protein, Urine, POC Trace Negative    Urobilinogen, POC 0.2 mg/dL     Nitrite, Urine, POC Negative Negative    Leukocyte Esterase, Urine, POC Negative Negative       UA - Micro  WBC - 0  RBC - 0  Bacteria - 0  Epith - 0    PHYSICAL EXAM    General appearance - appears to be in pain, non-toxic appearance  Mental status - alert, oriented to person, place, and time  Neck - supple, no significant adenopathy  Chest/Lung-  Quiet, even and easy respiratory effort without use of accessory muscles  Skin - normal coloration and turgor, no rashes      Assessment and Plan    ICD-10-CM    1. Left flank pain  R10.9 AMB POC URINALYSIS DIP STICK AUTO W/O MICRO     CT ABDOMEN PELVIS RENAL STONE Additional Contrast? None   2. Renal stone  N20.0 CT ABDOMEN PELVIS RENAL STONE Additional Contrast? None   3. Renal colic on left side  T50 CT ABDOMEN PELVIS RENAL STONE Additional Contrast? None   4. OAB (overactive bladder)  N32.81 CT ABDOMEN PELVIS RENAL STONE Additional Contrast? None   5. Neurogenic bladder  N31.9 CT ABDOMEN PELVIS RENAL STONE Additional Contrast? None   6. Recurrent UTI  N39.0 CT ABDOMEN PELVIS RENAL STONE Additional Contrast? None       L flank pain/renal stone/renal colic- sx consistent w renal colic. ddx diverticulitis. I recommend STAT CT urogram to r/o obstructing stone. She agrees w this. Toradol injection provided in office today for pain relief. OAB- stable. Cont vesicare 5 mg PO daily. Refill sent. NGB- stable. Cont crede maneuver and CIC PRN. Recurrent UTI- urine normal today. Does NOT want to wean OFF suppression therapy. Risks, benefits, and alternatives reviewed. Cont macrodantin 50 mg PO daily. Refill sent. Follow up pending CT results. Advised to call sooner if needed. Joaquín Ernandez, GORDY, APRN - CNP  Dr. Isaiah Sutherland is supervising physician today and he approves plan of care. ADDENDUM:    CT showed NO urinary stones. It did show L ovarian cyst. She was advised to follow up with GYN regarding this.      Joaquín Ernandez, GORDY, APRN - CNP

## 2022-06-15 NOTE — ED NOTES
Pt given dc instructions and a new take home prescription of Naproxen. States she will f/u with her OB/GYN as soon as posssible.  Pt given a wheelchair to SVITLANA Cason and has no further questions at this time     [place] [facility] [place], [facility]  06/14/22 2045

## 2022-09-15 ENCOUNTER — TELEPHONE (OUTPATIENT)
Dept: UROLOGY | Age: 22
End: 2022-09-15

## 2022-09-15 NOTE — TELEPHONE ENCOUNTER
Request for PA for solifenacin received from pharmacy via [address] [place] [address] My Meds. Still denied.  Per the website:

## 2022-09-19 NOTE — TELEPHONE ENCOUNTER
Please notify the patient. Can you see if she wants to try to go to [facility]? It is much cheaper then.     Emily Bergman, BHAVNAP, APRN - CNP

## 2022-09-26 NOTE — TELEPHONE ENCOUNTER
Patient has still not had a 30 day trial and failure of 2 of the following: oxybutynin, trospium, tolterodine. She will need to complete step therapy before her solifenacin is covered.  Or use GoodRx, where if she goes to [place] it will cost less than $10, or [facility] where it will be $18.

## 2022-11-03 ENCOUNTER — TELEPHONE (OUTPATIENT)
Dept: UROLOGY | Age: 22
End: 2022-11-03

## 2022-11-03 NOTE — TELEPHONE ENCOUNTER
Patient requesting Catheter refill via [address] [place] [address] 180 contact number [phone] if you have any questions. Please call patient if you need further information as well.

## 2023-01-11 ENCOUNTER — TELEPHONE (OUTPATIENT)
Dept: UROLOGY | Age: 23
End: 2023-01-11

## 2023-01-11 NOTE — TELEPHONE ENCOUNTER
Pt's mother called again (last called 11/3) for a refill rx for catheters. Pt's mother states she will run out before first of the week. Patient requesting Catheter refill via [address] [place] [address] contact number [phone] if you have any questions. Please call patient if you need further information as well. Or [phone] for cath refill, if not able to get at toll free #.

## 2023-02-17 ENCOUNTER — TELEPHONE (OUTPATIENT)
Dept: UROLOGY | Age: 23
End: 2023-02-17

## 2023-02-20 NOTE — TELEPHONE ENCOUNTER
Message received from Cover My Meds.  Prior authorization for the patient's prescription for solifenacin was denied due to step therapy, the patient must try and fail the formulary alternatives as listed below:

## 2023-03-20 ENCOUNTER — TRANSCRIBE ORDERS (OUTPATIENT)
Dept: SCHEDULING | Age: 23
End: 2023-03-20

## 2023-03-20 DIAGNOSIS — R10.9 ABDOMINAL PAIN, UNSPECIFIED ABDOMINAL LOCATION: Primary | ICD-10-CM

## 2023-03-20 DIAGNOSIS — R11.11 VOMITING WITHOUT NAUSEA, UNSPECIFIED VOMITING TYPE: ICD-10-CM

## 2023-03-21 ENCOUNTER — HOSPITAL ENCOUNTER (OUTPATIENT)
Dept: CT IMAGING | Age: 23
Discharge: HOME | End: 2023-03-24
Payer: COMMERCIAL

## 2023-03-21 DIAGNOSIS — R11.11 VOMITING WITHOUT NAUSEA, UNSPECIFIED VOMITING TYPE: ICD-10-CM

## 2023-03-21 DIAGNOSIS — R10.9 ABDOMINAL PAIN, UNSPECIFIED ABDOMINAL LOCATION: ICD-10-CM

## 2023-03-21 PROCEDURE — 74177 CT ABD & PELVIS W/CONTRAST: CPT

## 2023-03-21 PROCEDURE — 2580000003 HC RX 258: Performed by: NURSE PRACTITIONER

## 2023-03-21 PROCEDURE — 6360000004 HC RX CONTRAST MEDICATION: Performed by: NURSE PRACTITIONER

## 2023-03-21 RX ORDER — SODIUM CHLORIDE 0.9 % (FLUSH) 0.9 %
10 SYRINGE (ML) INJECTION
Status: COMPLETED | OUTPATIENT
Start: 2023-03-21 | End: 2023-03-21

## 2023-03-21 RX ORDER — 0.9 % SODIUM CHLORIDE 0.9 %
100 INTRAVENOUS SOLUTION INTRAVENOUS ONCE
Status: COMPLETED | OUTPATIENT
Start: 2023-03-21 | End: 2023-03-21

## 2023-03-21 RX ADMIN — SODIUM CHLORIDE 100 ML: 9 INJECTION, SOLUTION INTRAVENOUS at 11:01

## 2023-03-21 RX ADMIN — IOPAMIDOL 100 ML: 755 INJECTION, SOLUTION INTRAVENOUS at 11:00

## 2023-03-21 RX ADMIN — DIATRIZOATE MEGLUMINE AND DIATRIZOATE SODIUM 15 ML: 660; 100 LIQUID ORAL; RECTAL at 11:00

## 2023-03-21 RX ADMIN — SODIUM CHLORIDE, PRESERVATIVE FREE 10 ML: 5 INJECTION INTRAVENOUS at 11:01

## 2023-05-15 RX ORDER — NITROFURANTOIN MACROCRYSTALS 50 MG/1
50 CAPSULE ORAL DAILY
Qty: 90 CAPSULE | Refills: 0 | Status: SHIPPED | OUTPATIENT
Start: 2023-05-15

## 2023-09-06 RX ORDER — NITROFURANTOIN MACROCRYSTALS 50 MG/1
50 CAPSULE ORAL DAILY
Qty: 90 CAPSULE | Refills: 0 | OUTPATIENT
Start: 2023-09-06

## 2023-09-14 ENCOUNTER — OFFICE VISIT (OUTPATIENT)
Dept: UROLOGY | Age: 23
End: 2023-09-14
Payer: COMMERCIAL

## 2023-09-14 ENCOUNTER — TELEPHONE (OUTPATIENT)
Dept: UROLOGY | Age: 23
End: 2023-09-14

## 2023-09-14 DIAGNOSIS — N32.81 OAB (OVERACTIVE BLADDER): Primary | ICD-10-CM

## 2023-09-14 DIAGNOSIS — N31.9 NEUROGENIC BLADDER: ICD-10-CM

## 2023-09-14 DIAGNOSIS — N39.0 RECURRENT UTI: ICD-10-CM

## 2023-09-14 LAB
BILIRUBIN, URINE, POC: NEGATIVE
BLOOD URINE, POC: NEGATIVE
GLUCOSE URINE, POC: NEGATIVE
KETONES, URINE, POC: NEGATIVE
LEUKOCYTE ESTERASE, URINE, POC: NEGATIVE
NITRITE, URINE, POC: NEGATIVE
PH, URINE, POC: 7 (ref 4.6–8)
PROTEIN,URINE, POC: NEGATIVE
SPECIFIC GRAVITY, URINE, POC: 1.02 (ref 1–1.03)
URINALYSIS CLARITY, POC: NORMAL
URINALYSIS COLOR, POC: NORMAL
UROBILINOGEN, POC: NORMAL

## 2023-09-14 PROCEDURE — 99214 OFFICE O/P EST MOD 30 MIN: CPT | Performed by: NURSE PRACTITIONER

## 2023-09-14 PROCEDURE — 81003 URINALYSIS AUTO W/O SCOPE: CPT | Performed by: NURSE PRACTITIONER

## 2023-09-14 RX ORDER — NITROFURANTOIN MACROCRYSTALS 50 MG/1
50 CAPSULE ORAL DAILY
Qty: 90 CAPSULE | Refills: 3 | Status: SHIPPED | OUTPATIENT
Start: 2023-09-14

## 2023-09-14 ASSESSMENT — ENCOUNTER SYMPTOMS
NAUSEA: 0
BACK PAIN: 0

## 2023-09-14 NOTE — PROGRESS NOTES
[facility]  [facility]    [address] [facility] [address]  [facility]  [phone]          Zena Waldrop  : 2000    Chief Complaint   Patient presents with    Follow-up          HPI     Zena Waldrop is a 25 y.o. female w h/o hereditary spastic paraplegia. Previously followed with pediatric urology in the past with Dr. Andres Masters. Previously under the care of Dr Kishor Louis and Chato Dumont NP. Previously she could empty her bladder w crede maneuvers. However, about 3 years ago she had to start CIC. She was only doing this about 4 times per day. At our last visit one month ago, she reported over the last year she has had to increase her CIC to up to 12 times per day. She reports intense urgency that even wakes her from her sleep. She is running out of catheters due to increased amount. She denies spontaneous voids, incontinence, or difficulty w cath placement. No gross hematuria. I recommended adding anticholinergic to see if this could reduce the freq of CIC and also help w other LUTS. She was started on vesicare 5 mg PO daily. No longer on this. No issues w spasms or urgency at this time. She cont to CIC 5x/day. She has h/o recurrent UTI. She has had NO recurrence of this since Dr Kishor Louis started her on macrodantin 50 mg daily.         Past Medical History:   Diagnosis Date    Diabetes ([address])     type 2    Seizures ([address])     \"long time ago\"     Past Surgical History:   Procedure Laterality Date    ORTHOPEDIC SURGERY      \"reconstruction of bones in feet\"    [place] UNLISTED PROCEDURE CARDIAC SURGERY      VASCULAR SURGERY      \"legs on veins and muscles\"     Current Outpatient Medications   Medication Sig Dispense Refill    nitrofurantoin (MACRODANTIN) 50 MG capsule Take 1 capsule by mouth daily 90 capsule 3    baclofen (LIORESAL) 10 MG tablet Strength: 10 mg; Form: pump; SIG: take orally as directedOnly takes if pump malfunctions and if pump is working it is 75 mg daily via the pump

## 2024-02-14 ENCOUNTER — OFFICE VISIT (OUTPATIENT)
Dept: UROLOGY | Age: 24
End: 2024-02-14
Payer: COMMERCIAL

## 2024-02-14 DIAGNOSIS — N32.81 OAB (OVERACTIVE BLADDER): ICD-10-CM

## 2024-02-14 DIAGNOSIS — N31.9 NEUROGENIC BLADDER: ICD-10-CM

## 2024-02-14 DIAGNOSIS — N39.0 RECURRENT UTI: ICD-10-CM

## 2024-02-14 DIAGNOSIS — R10.9 BILATERAL FLANK PAIN: Primary | ICD-10-CM

## 2024-02-14 DIAGNOSIS — N20.0 RENAL STONE: ICD-10-CM

## 2024-02-14 PROCEDURE — 99214 OFFICE O/P EST MOD 30 MIN: CPT | Performed by: NURSE PRACTITIONER

## 2024-02-14 NOTE — PROGRESS NOTES
[facility]  [address] [place] [address]  [phone]          Janice Barton  : 2000    Chief Complaint   Patient presents with    Lower Back Pain          HPI     Janice Barton is a 23 y.o. female w h/o hereditary spastic paraplegia. Previously followed with pediatric urology in the past with Dr. Peña.  Previously under the care of Dr Grande and A Clinton NP.      Previously she could empty her bladder w crede maneuvers. However, about 3 years ago she had to start CIC. She was only doing this about 4 times per day. At our last visit one month ago, she reported over the last year she has had to increase her CIC to up to 12 times per day. She reports intense urgency that even wakes her from her sleep. She is running out of catheters due to increased amount. She denies spontaneous voids, incontinence, or difficulty w cath placement. No gross hematuria. I recommended adding anticholinergic to see if this could reduce the freq of CIC and also help w other LUTS. She was started on vesicare 5 mg PO daily. No longer on this. No issues w spasms or urgency at this time. She cont to CIC 5x/day.      She has h/o recurrent UTI. She has had NO recurrence of this since Dr Grande started her on macrodantin 50 mg daily.     ?h/o renal stone. Having more B flank pain for a few months. Recent sCr normal at 0.85 (23). No hematuria or difficulty with catheterization.     Accompanied today by her mother.     Of note, recently had issues w thoracic pump. This has been removed.       Past Medical History:   Diagnosis Date    Diabetes (HCC)     type 2    Seizures (HCC)     \"long time ago\"     Past Surgical History:   Procedure Laterality Date    ORTHOPEDIC SURGERY      \"reconstruction of bones in feet\"    [place] UNLISTED PROCEDURE CARDIAC SURGERY      VASCULAR SURGERY      \"legs on veins and muscles\"     Current Outpatient Medications   Medication Sig Dispense Refill    nitrofurantoin

## 2024-12-16 RX ORDER — NITROFURANTOIN MACROCRYSTALS 50 MG/1
50 CAPSULE ORAL DAILY
Qty: 30 CAPSULE | Refills: 1 | Status: SHIPPED | OUTPATIENT
Start: 2024-12-16

## 2025-03-11 ENCOUNTER — TELEPHONE (OUTPATIENT)
Dept: NEUROSURGERY | Age: 25
End: 2025-03-11

## 2025-03-27 DIAGNOSIS — N39.0 RECURRENT UTI: ICD-10-CM

## 2025-03-27 DIAGNOSIS — N39.0 RECURRENT UTI: Primary | ICD-10-CM

## 2025-03-27 LAB
APPEARANCE UR: CLEAR
BILIRUB UR QL: NEGATIVE
COLOR UR: NORMAL
GLUCOSE UR STRIP.AUTO-MCNC: NEGATIVE MG/DL
HGB UR QL STRIP: NEGATIVE
KETONES UR QL STRIP.AUTO: NEGATIVE MG/DL
LEUKOCYTE ESTERASE UR QL STRIP.AUTO: NEGATIVE
NITRITE UR QL STRIP.AUTO: NEGATIVE
PH UR STRIP: 6.5 (ref 5–9)
PROT UR STRIP-MCNC: NEGATIVE MG/DL
SP GR UR REFRACTOMETRY: 1.01 (ref 1–1.02)
UROBILINOGEN UR QL STRIP.AUTO: 0.2 EU/DL (ref 0.2–1)

## 2025-03-28 ENCOUNTER — RESULTS FOLLOW-UP (OUTPATIENT)
Dept: UROLOGY | Age: 25
End: 2025-03-28

## 2025-03-29 LAB
BACTERIA SPEC CULT: NORMAL
SERVICE CMNT-IMP: NORMAL

## 2025-04-29 ENCOUNTER — OFFICE VISIT (OUTPATIENT)
Dept: NEUROSURGERY | Age: 25
End: 2025-04-29
Payer: COMMERCIAL

## 2025-04-29 VITALS
OXYGEN SATURATION: 100 % | TEMPERATURE: 97.5 F | SYSTOLIC BLOOD PRESSURE: 118 MMHG | DIASTOLIC BLOOD PRESSURE: 60 MMHG | HEIGHT: 59 IN | BODY MASS INDEX: 24.24 KG/M2 | HEART RATE: 77 BPM

## 2025-04-29 DIAGNOSIS — G11.4 HEREDITARY SPASTIC PARAPLEGIA (HCC): Primary | ICD-10-CM

## 2025-04-29 PROCEDURE — 99205 OFFICE O/P NEW HI 60 MIN: CPT | Performed by: NEUROLOGICAL SURGERY

## 2025-04-29 NOTE — PROGRESS NOTES
[facility] OFFICE NOTE:         CC: Spasticity    History of Present Illness:  Janice Barton (2000) is a 24 y.o.  female who was referred for evaluation of spasticity.  She has a history of hereditary spastic paraparesis and had a baclofen pump in 2011.  She had her pump revised in 2018.  In August 2023 after a refill we will start and have increasing weakness with falls.  There was a questionable tubing fracture after they were unable to aspirate the catheter access port.  In 10/2023 her pump was replaced.  11/2023 was felt to have a CSF leak and had a catheter revision in 1/2024 1/29/2024 the pump was removed.  She is currently on no medications for spasticity.    HPI      Past Medical History:   Diagnosis Date    Diabetes (HCC)     type 2    Seizures (HCC)     \"long time ago\"      Past Surgical History:   Procedure Laterality Date    ORTHOPEDIC SURGERY      \"reconstruction of bones in feet\"    [place] UNLISTED PROCEDURE CARDIAC SURGERY      VASCULAR SURGERY      \"legs on veins and muscles\"      Current Outpatient Medications   Medication Sig Dispense Refill    nitrofurantoin (MACRODANTIN) 50 MG capsule TAKE 1 CAPSULE BY MOUTH DAILY 30 capsule 1    naproxen (NAPROSYN) 500 MG tablet Take 1 tablet by mouth 2 times daily for 5 days 10 tablet 0    baclofen (LIORESAL) 10 MG tablet Strength: 10 mg; Form: pump; SIG: take orally as directedOnly takes if pump malfunctions and if pump is working it is 75 mg daily via the pump      Cholecalciferol 50 MCG (2000 UT) TABS Take 1 tablet by mouth daily      cyanocobalamin 1000 MCG/ML injection Inject 1 mL into the muscle every 14 days      dexlansoprazole (DEXILANT) 60 MG CPDR delayed release capsule Take 1 capsule by mouth as needed      dicyclomine (BENTYL) 20 MG tablet Take 1 tablet by mouth      Drospiren-Eth Estrad-Levomefol 3-0.02-0.451 MG TABS Take 1 tablet by mouth daily      DULoxetine (CYMBALTA) 60 MG extended release capsule Take 1 capsule

## 2025-06-18 ENCOUNTER — TELEPHONE (OUTPATIENT)
Dept: NEUROSURGERY | Age: 25
End: 2025-06-18

## 2025-07-24 ENCOUNTER — OFFICE VISIT (OUTPATIENT)
Dept: UROLOGY | Age: 25
End: 2025-07-24
Payer: COMMERCIAL

## 2025-07-24 DIAGNOSIS — N39.0 RECURRENT UTI: Primary | ICD-10-CM

## 2025-07-24 DIAGNOSIS — N39.0 RECURRENT UTI: ICD-10-CM

## 2025-07-24 DIAGNOSIS — N31.9 NEUROGENIC BLADDER: ICD-10-CM

## 2025-07-24 DIAGNOSIS — R33.9 URINARY RETENTION: ICD-10-CM

## 2025-07-24 LAB
BILIRUBIN, URINE, POC: NEGATIVE
BLOOD URINE, POC: NEGATIVE
GLUCOSE URINE, POC: NEGATIVE MG/DL
KETONES, URINE, POC: NORMAL MG/DL
LEUKOCYTE ESTERASE, URINE, POC: NORMAL
NITRITE, URINE, POC: NEGATIVE
PH, URINE, POC: 7 (ref 4.6–8)
PROTEIN,URINE, POC: 100 MG/DL
SPECIFIC GRAVITY, URINE, POC: 1.02 (ref 1–1.03)
URINALYSIS CLARITY, POC: NORMAL
URINALYSIS COLOR, POC: NORMAL
UROBILINOGEN, POC: NORMAL MG/DL

## 2025-07-24 PROCEDURE — 99214 OFFICE O/P EST MOD 30 MIN: CPT | Performed by: NURSE PRACTITIONER

## 2025-07-24 PROCEDURE — 81003 URINALYSIS AUTO W/O SCOPE: CPT | Performed by: NURSE PRACTITIONER

## 2025-07-24 ASSESSMENT — ENCOUNTER SYMPTOMS
NAUSEA: 0
BACK PAIN: 0

## 2025-07-24 NOTE — PROGRESS NOTES
[facility]  [address] [place] [address]  [phone]          Janice Barton  : 2000    Chief Complaint   Patient presents with    Urinary Tract Infection          HPI     Janice Barton is a 24 y.o. female w h/o hereditary spastic paraplegia. Previously followed with pediatric urology in the past with Dr. Peña.  Previously under the care of Dr Grande and A Clinton NP.      Previously she could empty her bladder w crede maneuvers. She did end up having to CIC. Performing this 5x/day.    Prev on vesicare 5 mg for urinary frequency/urgency. No longer on this.      She has h/o recurrent UTI. Dr Grande started her on macrodantin 50 mg daily. this helps. Unfortunately, she is having more UTI's recently. Urine culture 25 grew no bacteria, 25 grew E Coli arellano-sensitive, 3/27/25 no bacteria.     ?h/o renal stone. Prev imaging wo stone. CT a/p 25 NO kidney stone or other urinary tract abnormality        Past Medical History:   Diagnosis Date    Diabetes (HCC)     type 2    Seizures (HCC)     \"long time ago\"     Past Surgical History:   Procedure Laterality Date    ORTHOPEDIC SURGERY      \"reconstruction of bones in feet\"    [place] UNLISTED PROCEDURE CARDIAC SURGERY      VASCULAR SURGERY      \"legs on veins and muscles\"     Current Outpatient Medications   Medication Sig Dispense Refill    nitrofurantoin (MACRODANTIN) 50 MG capsule TAKE 1 CAPSULE BY MOUTH DAILY 30 capsule 1    Cholecalciferol 50 MCG (2000) TABS Take 1 tablet by mouth daily      levonorgestrel (MIRENA) IUD 52 mg 1 each by IntraUTERine route      linaclotide (LINZESS) 290 MCG CAPS capsule Take by mouth every morning (before breakfast)      promethazine (PHENERGAN) 25 MG tablet Take 1 tablet by mouth every 6 hours as needed       No current facility-administered medications for this visit.     Allergies   Allergen Reactions    Latex Anxiety    Hydrocodone-Acetaminophen Shortness Of Breath

## 2025-07-26 LAB
BACTERIA SPEC CULT: NORMAL
SERVICE CMNT-IMP: NORMAL

## 2025-08-08 DIAGNOSIS — G11.4 HEREDITARY SPASTIC PARAPLEGIA (HCC): Primary | ICD-10-CM

## 2025-09-05 DIAGNOSIS — G11.4 HEREDITARY SPASTIC PARAPLEGIA (HCC): Primary | ICD-10-CM
